# Patient Record
Sex: MALE | Race: BLACK OR AFRICAN AMERICAN | NOT HISPANIC OR LATINO | URBAN - METROPOLITAN AREA
[De-identification: names, ages, dates, MRNs, and addresses within clinical notes are randomized per-mention and may not be internally consistent; named-entity substitution may affect disease eponyms.]

---

## 2017-11-21 ENCOUNTER — EMERGENCY (EMERGENCY)
Facility: HOSPITAL | Age: 32
LOS: 1 days | Discharge: HOME | End: 2017-11-21
Admitting: EMERGENCY MEDICINE
Payer: COMMERCIAL

## 2017-11-21 VITALS
DIASTOLIC BLOOD PRESSURE: 88 MMHG | SYSTOLIC BLOOD PRESSURE: 167 MMHG | HEART RATE: 75 BPM | OXYGEN SATURATION: 99 % | RESPIRATION RATE: 16 BRPM | TEMPERATURE: 98 F

## 2017-11-21 DIAGNOSIS — Z98.890 OTHER SPECIFIED POSTPROCEDURAL STATES: Chronic | ICD-10-CM

## 2017-11-21 PROCEDURE — 99284 EMERGENCY DEPT VISIT MOD MDM: CPT

## 2017-11-21 RX ORDER — FAMOTIDINE 10 MG/ML
20 INJECTION INTRAVENOUS ONCE
Qty: 0 | Refills: 0 | Status: COMPLETED | OUTPATIENT
Start: 2017-11-21 | End: 2017-11-21

## 2017-11-21 RX ADMIN — FAMOTIDINE 20 MILLIGRAM(S): 10 INJECTION INTRAVENOUS at 14:50

## 2017-11-21 RX ADMIN — Medication 30 MILLILITER(S): at 14:50

## 2017-11-21 NOTE — ED PROVIDER NOTE - OBJECTIVE STATEMENT
31 y/o M no PMHx presents c/o LUQ abdominal pain x 4 days. Describes the pain as a "full sensation" and "indigestion". Gets relief when he belches. Able to eat w/o issue. Symptoms are not related to food intake. Having normal bowel movements, last this morning. Tried drinking seltzer and ginger ale w/o relief. Denies fevers, chills, N/V, weight loss, changes in BM or any other complaints.

## 2017-11-21 NOTE — ED ADULT TRIAGE NOTE - CHIEF COMPLAINT QUOTE
pt amb to triage c/o abdm "discomfort" x 4 days believed to be gas, tolerating PO intake w/o N/V, last BM this morning "normal," pain relieved w/ palpating and burping

## 2017-11-21 NOTE — ED PROVIDER NOTE - PLAN OF CARE
Rest and drink plenty of fluids. Avoid eating foods that can irritate your stomach such as citrus, caffeine, and dairy products. Take Maalox as needed for ingestion. Follow up with your primary care doctor within 2-3 days of hospital discharge. Follow up with a gastroenterologist for further management. If symptoms worsen, please come back to the emergency room.

## 2017-11-21 NOTE — ED PROVIDER NOTE - MEDICAL DECISION MAKING DETAILS
33 y/o M w/ epigastric "fullness" and indigestion. Has not tried simethicone or other meds for indigestion. Admits to a poor diet. Normal BM, no N/V, no fever. Abdomen nontender on exam. Will give Pepcid/Maalox and dc w/ GI f/u

## 2017-12-31 ENCOUNTER — EMERGENCY (EMERGENCY)
Facility: HOSPITAL | Age: 32
LOS: 1 days | Discharge: HOME | End: 2017-12-31
Attending: EMERGENCY MEDICINE | Admitting: EMERGENCY MEDICINE
Payer: COMMERCIAL

## 2017-12-31 VITALS
DIASTOLIC BLOOD PRESSURE: 110 MMHG | SYSTOLIC BLOOD PRESSURE: 150 MMHG | TEMPERATURE: 98 F | RESPIRATION RATE: 16 BRPM | OXYGEN SATURATION: 100 % | HEART RATE: 64 BPM

## 2017-12-31 DIAGNOSIS — Z98.890 OTHER SPECIFIED POSTPROCEDURAL STATES: Chronic | ICD-10-CM

## 2017-12-31 PROCEDURE — 99283 EMERGENCY DEPT VISIT LOW MDM: CPT

## 2017-12-31 RX ORDER — DIAZEPAM 5 MG
1 TABLET ORAL
Qty: 15 | Refills: 0
Start: 2017-12-31

## 2017-12-31 RX ORDER — KETOROLAC TROMETHAMINE 30 MG/ML
30 SYRINGE (ML) INJECTION ONCE
Qty: 0 | Refills: 0 | Status: DISCONTINUED | OUTPATIENT
Start: 2017-12-31 | End: 2017-12-31

## 2017-12-31 RX ADMIN — Medication 30 MILLIGRAM(S): at 10:37

## 2017-12-31 NOTE — ED ADULT TRIAGE NOTE - CHIEF COMPLAINT QUOTE
pain l shoulder since fri,increasing upon turning neck. denies injury. surgery l shoulder 2/2016.   seen [place] er. " muscle pain"  reports pain less,still present.   denies ch pains present. states bp has been elevated on no meds

## 2017-12-31 NOTE — ED PROVIDER NOTE - OBJECTIVE STATEMENT
31 y/o M w/ no significant PMHx presents to ED c/o x1day of lt shoulder pain radiating to the lt upper back. Notes x2days ago pt felt a sudden sharp pain the lt chest and lt arm. Was seen at [facility] ER x2days ago and had normal EKG, no blood work done, given muscles relevants in ER, d/c'd home on no meds. States tightness in lt shoulder has resolved, however pain persists. Denies nausea, vomiting, diaphoresis, and other complaints. Reports in February 2017 +h/o torn lt rotator cuff, had surgery to repair. Denies other complaints. FHx of Cardiac issues in his grandmother. Pt works in housekeeping at [employer]. Allergic to penicillin. 33 y/o M w/ no significant PMHx presents to ED c/o x1day of lt shoulder pain radiating to the lt upper back. Notes x2days ago pt felt a sudden sharp pain the lt chest and lt arm. Was seen at [facility] ER x2days ago and had normal EKG, no blood work done, given ?muscle relevants in ER, d/c'd home on no meds. States tightness in lt shoulder has resolved, however pain persists. Denies nausea, vomiting, diaphoresis, and other complaints. Reports in February 2017 +h/o torn lt rotator cuff, had surgery to repair. Denies other complaints. FHx of Cardiac issues in his grandmother. 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 Pain worse with rom.  No fam h/o early CAD

## 2019-02-01 ENCOUNTER — EMERGENCY (EMERGENCY)
Facility: HOSPITAL | Age: 34
LOS: 1 days | Discharge: HOME | End: 2019-02-01
Admitting: EMERGENCY MEDICINE
Payer: COMMERCIAL

## 2019-02-01 VITALS
DIASTOLIC BLOOD PRESSURE: 94 MMHG | TEMPERATURE: 98 F | RESPIRATION RATE: 16 BRPM | SYSTOLIC BLOOD PRESSURE: 160 MMHG | OXYGEN SATURATION: 100 % | HEART RATE: 78 BPM

## 2019-02-01 VITALS
RESPIRATION RATE: 16 BRPM | TEMPERATURE: 98 F | SYSTOLIC BLOOD PRESSURE: 144 MMHG | DIASTOLIC BLOOD PRESSURE: 100 MMHG | OXYGEN SATURATION: 100 % | HEART RATE: 80 BPM

## 2019-02-01 DIAGNOSIS — Z98.890 OTHER SPECIFIED POSTPROCEDURAL STATES: Chronic | ICD-10-CM

## 2019-02-01 PROCEDURE — 99283 EMERGENCY DEPT VISIT LOW MDM: CPT

## 2019-02-01 PROCEDURE — 73610 X-RAY EXAM OF ANKLE: CPT | Mod: 26,RT

## 2019-02-01 NOTE — ED PROVIDER NOTE - MEDICAL DECISION MAKING DETAILS
Pt is a 34 y/o M nonsmoker PMHx HTN p/w right ankle pain x 2 weeks. -- likely musculoskeletal joint pain -- xray, outpatient podiatry follow up

## 2019-02-01 NOTE — ED PROVIDER NOTE - PLAN OF CARE
Advance activity as tolerated.  Continue all previously prescribed medications as directed unless otherwise instructed.  Keep extremity elevated and wrapped.  Apply cool compresses to affected area for 15 minutes, 3-4 times per day. Take Motrin (also sold as Advil or Ibuprofen) 400-600 mg (two or three 200 mg over the counter pills) every 8 hours as needed for moderate pain or fevers-- take with food. Take Tylenol 650mg (Two 325 mg pills) every 4-6 hours as needed for pain or fevers.  Follow up with your primary care physician and podiatry (referral list provided)  in 48-72 hours- bring copies of your results.  Return to the ER for worsening or persistent symptoms, including but not limited to worsening/persistent pain, swelling, redness, fevers, and/or ANY NEW OR CONCERNING SYMPTOMS. If you have issues obtaining follow up, please call: [phone] to obtain a doctor or specialist who takes your insurance in your area.  You may call [phone] to make an appointment with the internal medicine clinic.

## 2019-02-01 NOTE — ED PROVIDER NOTE - PROGRESS NOTE DETAILS
PA STOREY:  Xray negative for acute pathology.  ACE applied with good relief.  Pt medically stable for discharge. Pt to follow up with PMD and podiatry (referral list provided).

## 2019-02-01 NOTE — ED PROVIDER NOTE - LOWER EXTREMITY EXAM, RIGHT
SWELLING/nontender; no warmth; no crepitus; FROM; 5/5 strength; 2+ dp pulse; sensation intact to light touch, < 2 sec capillary refill

## 2019-02-01 NOTE — ED PROVIDER NOTE - CHPI ED SYMPTOMS NEG
no tingling/no numbness/no stiffness/no weakness/no back pain/no fever/no deformity/no difficulty bearing weight/no bruising/no abrasion

## 2019-02-01 NOTE — ED ADULT TRIAGE NOTE - CHIEF COMPLAINT QUOTE
pt here for right ankle pain that started 2 weeks ago. denies any falls or trauma.  also c/o right swelling

## 2019-02-01 NOTE — ED PROVIDER NOTE - CARE PLAN
Principal Discharge DX:	Ankle pain  Assessment and plan of treatment:	Advance activity as tolerated.  Continue all previously prescribed medications as directed unless otherwise instructed.  Keep extremity elevated and wrapped.  Apply cool compresses to affected area for 15 minutes, 3-4 times per day. Take Motrin (also sold as Advil or Ibuprofen) 400-600 mg (two or three 200 mg over the counter pills) every 8 hours as needed for moderate pain or fevers-- take with food. Take Tylenol 650mg (Two 325 mg pills) every 4-6 hours as needed for pain or fevers.  Follow up with your primary care physician and podiatry (referral list provided)  in 48-72 hours- bring copies of your results.  Return to the ER for worsening or persistent symptoms, including but not limited to worsening/persistent pain, swelling, redness, fevers, and/or ANY NEW OR CONCERNING SYMPTOMS. If you have issues obtaining follow up, please call: [phone] to obtain a doctor or specialist who takes your insurance in your area.  You may call [phone] to make an appointment with the internal medicine clinic.

## 2019-02-01 NOTE — ED PROVIDER NOTE - OBJECTIVE STATEMENT
Pt is a 34 y/o M nonsmoker PMHx HTN p/w right ankle pain x 2 weeks.  Pt notes two weeks ago, upon standing up after waking up in the morning, pt noticed moderate pain to right ankle, greatest at lateral malleolar region, which would worsen with walking.  Pt notes pain improves with epsom salt and elevation, but worsens with walking or standing.  Pt notes mild swelling to region.  Pt denies any fevers, chills, numbness, weakness, trauma, redness, warmth, joint pain elsewhere, penile discharge, testicular pain, eye pain, h/o STDs, family history of autoimmune disorders, h/o tick bites, h/o hiking, illicit drug use, similar pian in the past, pain elsewhere, or any other specific complaints.  Presently, pt has no pain, but has pain up to 6/10, with walking.

## 2019-07-10 ENCOUNTER — EMERGENCY (EMERGENCY)
Facility: HOSPITAL | Age: 34
LOS: 1 days | Discharge: HOME | End: 2019-07-10
Admitting: EMERGENCY MEDICINE
Payer: COMMERCIAL

## 2019-07-10 VITALS
TEMPERATURE: 98 F | RESPIRATION RATE: 18 BRPM | HEART RATE: 57 BPM | DIASTOLIC BLOOD PRESSURE: 90 MMHG | SYSTOLIC BLOOD PRESSURE: 129 MMHG | OXYGEN SATURATION: 100 %

## 2019-07-10 DIAGNOSIS — Z98.890 OTHER SPECIFIED POSTPROCEDURAL STATES: Chronic | ICD-10-CM

## 2019-07-10 PROCEDURE — 99282 EMERGENCY DEPT VISIT SF MDM: CPT

## 2019-07-10 NOTE — ED PROVIDER NOTE - CLINICAL SUMMARY MEDICAL DECISION MAKING FREE TEXT BOX
Pt is a 33 y/o M nonsmoker no PMHx p/w burn 4 days ago. -- healing burn w/o e/o infection -- bacitracin, pmd follow up

## 2019-07-10 NOTE — ED PROVIDER NOTE - OBJECTIVE STATEMENT
Pt is a 33 y/o M nonsmoker no PMHx p/w burn 4 days ago.  Pt states 4 days ago while cooking grits, hot grits splattered onto left forearm.  Pt then gradually developed a "bubble" that formed at left forearm, which ruptured on its own.  Pt noted skin around burn began to swell, after which swelling and pain gradually resolved.  Pt presents today to get burned evaluated.  Pt denies any fevers, redness, pus, red streaks, or any active pain.  Pt notes last tetanus was within past 5 years.

## 2019-07-10 NOTE — ED PROVIDER NOTE - SKIN LOCATION #1
1.5 cm x 1.5 cm area of hypertrophied, nontender, nonfluctuant skin at left forearm w/o redness, warmth or purulent drainage; no crepitus; NVI distal to wound on affected extremity

## 2019-07-10 NOTE — ED PROVIDER NOTE - NSFOLLOWUPINSTRUCTIONS_ED_ALL_ED_FT
Advance activity as tolerated.  Continue all previously prescribed medications as directed unless otherwise instructed.  Apply bacitracin (available over the counter) twice daily to affected area until healed.  Follow up with your primary care physician in 48-72 hours- bring copies of your results.  Return to the ER for worsening or persistent symptoms, including but not limited to worsening/persistent pain, swelling, redness, fevers, red streaks, pus and/or ANY NEW OR CONCERNING SYMPTOMS. If you have issues obtaining follow up, please call: [phone] to obtain a doctor or specialist who takes your insurance in your area.  You may call [phone] to make an appointment with the internal medicine clinic.

## 2019-07-10 NOTE — ED PROVIDER NOTE - CARE PLAN
Principal Discharge DX:	Burn  Assessment and plan of treatment:	Advance activity as tolerated.  Continue all previously prescribed medications as directed unless otherwise instructed.  Apply bacitracin (available over the counter) twice daily to affected area until healed.  Follow up with your primary care physician in 48-72 hours- bring copies of your results.  Return to the ER for worsening or persistent symptoms, including but not limited to worsening/persistent pain, swelling, redness, fevers, red streaks, pus and/or ANY NEW OR CONCERNING SYMPTOMS. If you have issues obtaining follow up, please call: [phone] to obtain a doctor or specialist who takes your insurance in your area.  You may call [phone] to make an appointment with the internal medicine clinic.

## 2019-07-10 NOTE — ED PROVIDER NOTE - PLAN OF CARE
Advance activity as tolerated.  Continue all previously prescribed medications as directed unless otherwise instructed.  Apply bacitracin (available over the counter) twice daily to affected area until healed.  Follow up with your primary care physician in 48-72 hours- bring copies of your results.  Return to the ER for worsening or persistent symptoms, including but not limited to worsening/persistent pain, swelling, redness, fevers, red streaks, pus and/or ANY NEW OR CONCERNING SYMPTOMS. If you have issues obtaining follow up, please call: [phone] to obtain a doctor or specialist who takes your insurance in your area.  You may call [phone] to make an appointment with the internal medicine clinic.

## 2020-04-25 ENCOUNTER — MESSAGE (OUTPATIENT)
Age: 35
End: 2020-04-25

## 2021-03-12 NOTE — ED PROVIDER NOTE - CARE PLAN
Principal Discharge DX:	Indigestion  Instructions for follow-up, activity and diet:	Rest and drink plenty of fluids. Avoid eating foods that can irritate your stomach such as citrus, caffeine, and dairy products. Take Maalox as needed for ingestion. Follow up with your primary care doctor within 2-3 days of hospital discharge. Follow up with a gastroenterologist for further management. If symptoms worsen, please come back to the emergency room.
12-Mar-2021 14:33

## 2021-08-26 ENCOUNTER — INPATIENT (INPATIENT)
Facility: HOSPITAL | Age: 36
LOS: 1 days | Discharge: HOME | End: 2021-08-28
Attending: STUDENT IN AN ORGANIZED HEALTH CARE EDUCATION/TRAINING PROGRAM | Admitting: STUDENT IN AN ORGANIZED HEALTH CARE EDUCATION/TRAINING PROGRAM
Payer: COMMERCIAL

## 2021-08-26 VITALS
RESPIRATION RATE: 17 BRPM | DIASTOLIC BLOOD PRESSURE: 109 MMHG | TEMPERATURE: 98 F | SYSTOLIC BLOOD PRESSURE: 151 MMHG | OXYGEN SATURATION: 99 % | HEART RATE: 109 BPM

## 2021-08-26 DIAGNOSIS — Z98.890 OTHER SPECIFIED POSTPROCEDURAL STATES: Chronic | ICD-10-CM

## 2021-08-26 PROCEDURE — 99285 EMERGENCY DEPT VISIT HI MDM: CPT

## 2021-08-26 NOTE — ED ADULT TRIAGE NOTE - CHIEF COMPLAINT QUOTE
pt arrives w/ c/o back pain radiating to legs. 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 pt states after that he got dizzy and vomited. pt states he now has charley horses in his legs and has abdominal pain. pt satmikaela was told his BP is high but not on meds for it.

## 2021-08-27 DIAGNOSIS — Z29.9 ENCOUNTER FOR PROPHYLACTIC MEASURES, UNSPECIFIED: ICD-10-CM

## 2021-08-27 DIAGNOSIS — K35.80 UNSPECIFIED ACUTE APPENDICITIS: ICD-10-CM

## 2021-08-27 DIAGNOSIS — T67.5XXA HEAT EXHAUSTION, UNSPECIFIED, INITIAL ENCOUNTER: ICD-10-CM

## 2021-08-27 DIAGNOSIS — N17.9 ACUTE KIDNEY FAILURE, UNSPECIFIED: ICD-10-CM

## 2021-08-27 DIAGNOSIS — M62.82 RHABDOMYOLYSIS: ICD-10-CM

## 2021-08-27 LAB
ALBUMIN SERPL ELPH-MCNC: 4.4 G/DL — SIGNIFICANT CHANGE UP (ref 3.3–5)
ALBUMIN SERPL ELPH-MCNC: 5.5 G/DL — HIGH (ref 3.3–5)
ALP SERPL-CCNC: 119 U/L — SIGNIFICANT CHANGE UP (ref 40–120)
ALP SERPL-CCNC: 88 U/L — SIGNIFICANT CHANGE UP (ref 40–120)
ALT FLD-CCNC: 25 U/L — SIGNIFICANT CHANGE UP (ref 4–41)
ALT FLD-CCNC: 33 U/L — SIGNIFICANT CHANGE UP (ref 4–41)
ANION GAP SERPL CALC-SCNC: 14 MMOL/L — SIGNIFICANT CHANGE UP (ref 7–14)
ANION GAP SERPL CALC-SCNC: 19 MMOL/L — HIGH (ref 7–14)
ANION GAP SERPL CALC-SCNC: 25 MMOL/L — HIGH (ref 7–14)
APAP SERPL-MCNC: <15 UG/ML — SIGNIFICANT CHANGE UP (ref 15–25)
APPEARANCE UR: ABNORMAL
APPEARANCE UR: CLEAR — SIGNIFICANT CHANGE UP
AST SERPL-CCNC: 32 U/L — SIGNIFICANT CHANGE UP (ref 4–40)
AST SERPL-CCNC: 36 U/L — SIGNIFICANT CHANGE UP (ref 4–40)
BACTERIA # UR AUTO: ABNORMAL
BASOPHILS # BLD AUTO: 0.02 K/UL — SIGNIFICANT CHANGE UP (ref 0–0.2)
BASOPHILS NFR BLD AUTO: 0.2 % — SIGNIFICANT CHANGE UP (ref 0–2)
BILIRUB SERPL-MCNC: 0.6 MG/DL — SIGNIFICANT CHANGE UP (ref 0.2–1.2)
BILIRUB SERPL-MCNC: 0.6 MG/DL — SIGNIFICANT CHANGE UP (ref 0.2–1.2)
BILIRUB UR-MCNC: ABNORMAL
BILIRUB UR-MCNC: NEGATIVE — SIGNIFICANT CHANGE UP
BLOOD GAS VENOUS COMPREHENSIVE RESULT: SIGNIFICANT CHANGE UP
BUN SERPL-MCNC: 22 MG/DL — SIGNIFICANT CHANGE UP (ref 7–23)
BUN SERPL-MCNC: 31 MG/DL — HIGH (ref 7–23)
BUN SERPL-MCNC: 32 MG/DL — HIGH (ref 7–23)
CALCIUM SERPL-MCNC: 11 MG/DL — HIGH (ref 8.4–10.5)
CALCIUM SERPL-MCNC: 8.9 MG/DL — SIGNIFICANT CHANGE UP (ref 8.4–10.5)
CALCIUM SERPL-MCNC: 9.2 MG/DL — SIGNIFICANT CHANGE UP (ref 8.4–10.5)
CHLORIDE SERPL-SCNC: 104 MMOL/L — SIGNIFICANT CHANGE UP (ref 98–107)
CHLORIDE SERPL-SCNC: 93 MMOL/L — LOW (ref 98–107)
CHLORIDE SERPL-SCNC: 99 MMOL/L — SIGNIFICANT CHANGE UP (ref 98–107)
CHLORIDE UR-SCNC: 29 MMOL/L — SIGNIFICANT CHANGE UP
CK SERPL-CCNC: 789 U/L — HIGH (ref 30–200)
CK SERPL-CCNC: 903 U/L — HIGH (ref 30–200)
CO2 SERPL-SCNC: 15 MMOL/L — LOW (ref 22–31)
CO2 SERPL-SCNC: 16 MMOL/L — LOW (ref 22–31)
CO2 SERPL-SCNC: 20 MMOL/L — LOW (ref 22–31)
COD CRY URNS QL: ABNORMAL
COLOR SPEC: SIGNIFICANT CHANGE UP
COLOR SPEC: YELLOW — SIGNIFICANT CHANGE UP
CREAT SERPL-MCNC: 1.54 MG/DL — HIGH (ref 0.5–1.3)
CREAT SERPL-MCNC: 2.64 MG/DL — HIGH (ref 0.5–1.3)
CREAT SERPL-MCNC: 3.34 MG/DL — HIGH (ref 0.5–1.3)
DIFF PNL FLD: ABNORMAL
DIFF PNL FLD: NEGATIVE — SIGNIFICANT CHANGE UP
EOSINOPHIL # BLD AUTO: 0.02 K/UL — SIGNIFICANT CHANGE UP (ref 0–0.5)
EOSINOPHIL NFR BLD AUTO: 0.2 % — SIGNIFICANT CHANGE UP (ref 0–6)
EPI CELLS # UR: 18 /HPF — HIGH (ref 0–5)
ETHANOL SERPL-MCNC: <10 MG/DL — SIGNIFICANT CHANGE UP
GLUCOSE SERPL-MCNC: 89 MG/DL — SIGNIFICANT CHANGE UP (ref 70–99)
GLUCOSE SERPL-MCNC: 98 MG/DL — SIGNIFICANT CHANGE UP (ref 70–99)
GLUCOSE SERPL-MCNC: 99 MG/DL — SIGNIFICANT CHANGE UP (ref 70–99)
GLUCOSE UR QL: NEGATIVE — SIGNIFICANT CHANGE UP
GLUCOSE UR QL: NEGATIVE — SIGNIFICANT CHANGE UP
HCT VFR BLD CALC: 52.5 % — HIGH (ref 39–50)
HGB BLD-MCNC: 17.8 G/DL — HIGH (ref 13–17)
HYALINE CASTS # UR AUTO: 1 /LPF — SIGNIFICANT CHANGE UP (ref 0–7)
IANC: 8.59 K/UL — HIGH (ref 1.5–8.5)
IMM GRANULOCYTES NFR BLD AUTO: 0.3 % — SIGNIFICANT CHANGE UP (ref 0–1.5)
KETONES UR-MCNC: ABNORMAL
KETONES UR-MCNC: ABNORMAL
LACTATE SERPL-SCNC: 1.2 MMOL/L — SIGNIFICANT CHANGE UP (ref 0.5–2)
LEUKOCYTE ESTERASE UR-ACNC: NEGATIVE — SIGNIFICANT CHANGE UP
LEUKOCYTE ESTERASE UR-ACNC: NEGATIVE — SIGNIFICANT CHANGE UP
LYMPHOCYTES # BLD AUTO: 2.5 K/UL — SIGNIFICANT CHANGE UP (ref 1–3.3)
LYMPHOCYTES # BLD AUTO: 20.5 % — SIGNIFICANT CHANGE UP (ref 13–44)
MAGNESIUM SERPL-MCNC: 1.9 MG/DL — SIGNIFICANT CHANGE UP (ref 1.6–2.6)
MAGNESIUM SERPL-MCNC: 2 MG/DL — SIGNIFICANT CHANGE UP (ref 1.6–2.6)
MAGNESIUM SERPL-MCNC: 2.3 MG/DL — SIGNIFICANT CHANGE UP (ref 1.6–2.6)
MCHC RBC-ENTMCNC: 27.1 PG — SIGNIFICANT CHANGE UP (ref 27–34)
MCHC RBC-ENTMCNC: 33.9 GM/DL — SIGNIFICANT CHANGE UP (ref 32–36)
MCV RBC AUTO: 79.8 FL — LOW (ref 80–100)
MONOCYTES # BLD AUTO: 1.05 K/UL — HIGH (ref 0–0.9)
MONOCYTES NFR BLD AUTO: 8.6 % — SIGNIFICANT CHANGE UP (ref 2–14)
NEUTROPHILS # BLD AUTO: 8.59 K/UL — HIGH (ref 1.8–7.4)
NEUTROPHILS NFR BLD AUTO: 70.2 % — SIGNIFICANT CHANGE UP (ref 43–77)
NITRITE UR-MCNC: NEGATIVE — SIGNIFICANT CHANGE UP
NITRITE UR-MCNC: NEGATIVE — SIGNIFICANT CHANGE UP
NRBC # BLD: 0 /100 WBCS — SIGNIFICANT CHANGE UP
NRBC # FLD: 0 K/UL — SIGNIFICANT CHANGE UP
PH UR: 5.5 — SIGNIFICANT CHANGE UP (ref 5–8)
PH UR: 6 — SIGNIFICANT CHANGE UP (ref 5–8)
PHOSPHATE SERPL-MCNC: 2.5 MG/DL — SIGNIFICANT CHANGE UP (ref 2.5–4.5)
PHOSPHATE SERPL-MCNC: 5.7 MG/DL — HIGH (ref 2.5–4.5)
PHOSPHATE SERPL-MCNC: 6.2 MG/DL — HIGH (ref 2.5–4.5)
PLATELET # BLD AUTO: 162 K/UL — SIGNIFICANT CHANGE UP (ref 150–400)
POTASSIUM SERPL-MCNC: 3.9 MMOL/L — SIGNIFICANT CHANGE UP (ref 3.5–5.3)
POTASSIUM SERPL-MCNC: 4 MMOL/L — SIGNIFICANT CHANGE UP (ref 3.5–5.3)
POTASSIUM SERPL-MCNC: 4.1 MMOL/L — SIGNIFICANT CHANGE UP (ref 3.5–5.3)
POTASSIUM SERPL-SCNC: 3.9 MMOL/L — SIGNIFICANT CHANGE UP (ref 3.5–5.3)
POTASSIUM SERPL-SCNC: 4 MMOL/L — SIGNIFICANT CHANGE UP (ref 3.5–5.3)
POTASSIUM SERPL-SCNC: 4.1 MMOL/L — SIGNIFICANT CHANGE UP (ref 3.5–5.3)
POTASSIUM UR-SCNC: 9.9 MMOL/L — SIGNIFICANT CHANGE UP
PROT SERPL-MCNC: 6.9 G/DL — SIGNIFICANT CHANGE UP (ref 6–8.3)
PROT SERPL-MCNC: 9.4 G/DL — HIGH (ref 6–8.3)
PROT UR-MCNC: ABNORMAL
PROT UR-MCNC: NEGATIVE — SIGNIFICANT CHANGE UP
RBC # BLD: 6.58 M/UL — HIGH (ref 4.2–5.8)
RBC # FLD: 16.9 % — HIGH (ref 10.3–14.5)
RBC CASTS # UR COMP ASSIST: 13 /HPF — HIGH (ref 0–4)
SALICYLATES SERPL-MCNC: <5 MG/DL — LOW (ref 15–30)
SARS-COV-2 RNA SPEC QL NAA+PROBE: SIGNIFICANT CHANGE UP
SODIUM SERPL-SCNC: 133 MMOL/L — LOW (ref 135–145)
SODIUM SERPL-SCNC: 134 MMOL/L — LOW (ref 135–145)
SODIUM SERPL-SCNC: 138 MMOL/L — SIGNIFICANT CHANGE UP (ref 135–145)
SODIUM UR-SCNC: 33 MMOL/L — SIGNIFICANT CHANGE UP
SP GR SPEC: 1.01 — SIGNIFICANT CHANGE UP (ref 1–1.05)
SP GR SPEC: 1.03 — SIGNIFICANT CHANGE UP (ref 1–1.05)
TOXICOLOGY SCREEN, DRUGS OF ABUSE, SERUM RESULT: SIGNIFICANT CHANGE UP
UROBILINOGEN FLD QL: ABNORMAL
UROBILINOGEN FLD QL: SIGNIFICANT CHANGE UP
WBC # BLD: 12.22 K/UL — HIGH (ref 3.8–10.5)
WBC # FLD AUTO: 12.22 K/UL — HIGH (ref 3.8–10.5)
WBC UR QL: 28 /HPF — HIGH (ref 0–5)

## 2021-08-27 PROCEDURE — 76770 US EXAM ABDO BACK WALL COMP: CPT | Mod: 26

## 2021-08-27 PROCEDURE — 99222 1ST HOSP IP/OBS MODERATE 55: CPT

## 2021-08-27 RX ORDER — SODIUM CHLORIDE 9 MG/ML
1000 INJECTION, SOLUTION INTRAVENOUS ONCE
Refills: 0 | Status: COMPLETED | OUTPATIENT
Start: 2021-08-27 | End: 2021-08-27

## 2021-08-27 RX ORDER — ACETAMINOPHEN 500 MG
650 TABLET ORAL ONCE
Refills: 0 | Status: COMPLETED | OUTPATIENT
Start: 2021-08-27 | End: 2021-08-27

## 2021-08-27 RX ORDER — LANOLIN ALCOHOL/MO/W.PET/CERES
3 CREAM (GRAM) TOPICAL AT BEDTIME
Refills: 0 | Status: DISCONTINUED | OUTPATIENT
Start: 2021-08-27 | End: 2021-08-28

## 2021-08-27 RX ORDER — ONDANSETRON 8 MG/1
4 TABLET, FILM COATED ORAL EVERY 8 HOURS
Refills: 0 | Status: DISCONTINUED | OUTPATIENT
Start: 2021-08-27 | End: 2021-08-28

## 2021-08-27 RX ORDER — ACETAMINOPHEN 500 MG
650 TABLET ORAL EVERY 6 HOURS
Refills: 0 | Status: DISCONTINUED | OUTPATIENT
Start: 2021-08-27 | End: 2021-08-28

## 2021-08-27 RX ORDER — SODIUM CHLORIDE 9 MG/ML
1000 INJECTION, SOLUTION INTRAVENOUS
Refills: 0 | Status: DISCONTINUED | OUTPATIENT
Start: 2021-08-27 | End: 2021-08-28

## 2021-08-27 RX ORDER — SODIUM CHLORIDE 9 MG/ML
1000 INJECTION INTRAMUSCULAR; INTRAVENOUS; SUBCUTANEOUS ONCE
Refills: 0 | Status: COMPLETED | OUTPATIENT
Start: 2021-08-27 | End: 2021-08-27

## 2021-08-27 RX ADMIN — Medication 650 MILLIGRAM(S): at 01:19

## 2021-08-27 RX ADMIN — SODIUM CHLORIDE 1000 MILLILITER(S): 9 INJECTION, SOLUTION INTRAVENOUS at 01:28

## 2021-08-27 RX ADMIN — SODIUM CHLORIDE 1000 MILLILITER(S): 9 INJECTION INTRAMUSCULAR; INTRAVENOUS; SUBCUTANEOUS at 01:19

## 2021-08-27 RX ADMIN — SODIUM CHLORIDE 1000 MILLILITER(S): 9 INJECTION, SOLUTION INTRAVENOUS at 03:43

## 2021-08-27 RX ADMIN — SODIUM CHLORIDE 150 MILLILITER(S): 9 INJECTION, SOLUTION INTRAVENOUS at 06:52

## 2021-08-27 RX ADMIN — SODIUM CHLORIDE 150 MILLILITER(S): 9 INJECTION, SOLUTION INTRAVENOUS at 22:04

## 2021-08-27 NOTE — ED ADULT NURSE NOTE - OBJECTIVE STATEMENT
Pt received to trauma a a&ox4, ambulatory c/o body pain. Pt states that he was moving dressers earlier today and he had cramping to his right arm. Pt states that he started shaking his arm out and then became very dizzy. Pt states that he had 1 episode of vomiting. Pt states that after he vomited he felt better. Pt states that he went home and when he went into the bath, his whole body began to cramp. Pt states that he had cramping to bilateral arms and legs. Pt denies any cramping at this time. Pt in no acute distress. Respirations even and unlabored. Pt placed on cardiac monitor. Comfort measures provided. Awaiting MD benjamin.

## 2021-08-27 NOTE — ED PROVIDER NOTE - NS ED ROS FT
Gen: +generalized body cramps Denies fever, chills  CV: Denies chest pain  Skin: Denies rash, erythema  Resp: Denies SOB, cough  ENT: Denies nasal congestion  Eyes: Denies blurry vision  GI: +nausea, vomiting, denies diarrhea  Msk: Denies LE swelling  : Denies dysuria  Neuro: Denies headache

## 2021-08-27 NOTE — H&P ADULT - NSHPSOCIALHISTORY_GEN_ALL_CORE
Fully independent in all ADLs. Fully independent in all ADLs.  Reports no smoking, occasional Etoh use, and no illicit drug use.

## 2021-08-27 NOTE — PROVIDER CONTACT NOTE (OTHER) - ASSESSMENT
a/ox4, on maintenance fluids, BP of 157/100, denies of pain, just generalized soreness
Patient /111, recheck 159/114. Patient states this BP is his normal. Patient denies chest pain, dizziness, vision changes, shortness of breath. Patient prescribed BP meds at home but does not take them. RN educated patient on risks of prolonged high blood pressure.

## 2021-08-27 NOTE — ED PROVIDER NOTE - PROGRESS NOTE DETAILS
DD ED ATTG:  Pt found to have renal failure, no previous labs available.  Rpt labs, rx maintenance fluids, check renal US, admit.

## 2021-08-27 NOTE — H&P ADULT - NSHPREVIEWOFSYSTEMS_GEN_ALL_CORE
Review of Systems:   CONSTITUTIONAL: No fever, weight loss, or fatigue  EYES: No eye pain, visual disturbances, or discharge  ENMT:  No difficulty hearing, tinnitus, vertigo; No sinus or throat pain  NECK: No pain or stiffness  RESPIRATORY: No cough, wheezing, chills or hemoptysis; No shortness of breath  CARDIOVASCULAR: No chest pain, palpitations, dizziness, or leg swelling  GASTROINTESTINAL: No abdominal or epigastric pain. No nausea, vomiting, or hematemesis; No diarrhea or constipation. No melena or hematochezia.  GENITOURINARY: No dysuria, frequency, hematuria, or incontinence  NEUROLOGICAL: No headaches, memory loss, loss of strength, numbness, or tremors  SKIN: No itching, burning, rashes, or lesions   ENDOCRINE: No heat or cold intolerance; No hair loss  MUSCULOSKELETAL: No joint pain or swelling; No muscle, back, or extremity pain Review of Systems:   CONSTITUTIONAL: No fever, weight loss, or fatigue  EYES: No eye pain, visual disturbances, or discharge  ENMT:  No difficulty hearing, tinnitus, vertigo; No sinus or throat pain  NECK: No pain or stiffness  RESPIRATORY: No cough, wheezing, chills or hemoptysis; No shortness of breath  CARDIOVASCULAR: No chest pain, palpitations, dizziness, or leg swelling  GASTROINTESTINAL: No abdominal or epigastric pain. NO N/V/C/D.   GENITOURINARY: No dysuria, frequency, hematuria, or incontinence  NEUROLOGICAL: No headaches, memory loss, loss of strength, numbness, or tremors  SKIN: No itching, burning, rashes, or lesions   ENDOCRINE: No heat or cold intolerance; No hair loss  MUSCULOSKELETAL: Mild muscle aches and pains, improving. Review of Systems:   CONSTITUTIONAL: No fever, weight loss, or fatigue  EYES: No eye pain, visual disturbances, or discharge  ENMT:  No difficulty hearing, tinnitus, vertigo; No sinus or throat pain  NECK: No pain or stiffness  RESPIRATORY: No cough, wheezing, chills or hemoptysis; No shortness of breath  CARDIOVASCULAR: No chest pain, palpitations, or leg swelling, dizziness +   GASTROINTESTINAL: No abdominal or epigastric pain. NO N/V/C/D.   GENITOURINARY: No dysuria, frequency, hematuria, or incontinence  NEUROLOGICAL: No headaches, memory loss, loss of strength, numbness, or tremors  SKIN: No itching, burning, rashes, or lesions   ENDOCRINE: No heat or cold intolerance; No hair loss  MUSCULOSKELETAL: Mild muscle aches and pains, improving.

## 2021-08-27 NOTE — H&P ADULT - HISTORY OF PRESENT ILLNESS
HPI: 37 yo M with PMH of HTN (does not take any medications by choice) presents with 1d of body cramping. States he was moving and lifting heavy furniture over past couple of days. Denies chest pain, shortness of breath, f/c. Had an episode of n/v earlier during the day. No nausea at this time.   HPI: 35 yo M with PMH of HTN (does not take any medications by choice) presents with 1d of body cramping, muscle cramps in his arms, stomach, and legs, associated with one episode of nonbloody/nonbilious vomiting. States he was moving and lifting heavy furniture over past couple of days in the excessive heat. Denies chest pain, shortness of breath, f/c. Had an episode of n/v earlier during the day. No nausea at this time.   HPI: 35 yo M with PMH of HTN (does not take any medications by choice) presents with 1d of body cramping, muscle cramps in his arms, stomach, and legs, associated with one episode of nonbloody/nonbilious vomiting. States he was moving and lifting heavy furniture over past couple of days in the excessive heat. Denies chest pain, shortness of breath, abdominal pain, fever, chills, dizziness, palpitations. Patient reports he noted he had not urinated the whole day yesterday. No prior similar episodes. Currently not on any medications as home.    HPI: 35 yo M with PMH of HTN (does not take any medications by choice) presents with 1d of body cramping, muscle cramps in his arms, stomach, and legs, associated with dizziness, mild headache, one episode of nonbloody/nonbilious vomiting. States he was moving and lifting heavy furniture over past couple of days in the excessive heat. Denies chest pain, shortness of breath, abdominal pain, fever, chills, dizziness, palpitations. Patient reports he noted he had not urinated the whole day yesterday. No prior similar episodes. Currently not on any medications as home.

## 2021-08-27 NOTE — PHARMACOTHERAPY INTERVENTION NOTE - COMMENTS
Medication history is complete. Medication list updated in Outpatient Medication Record (OMR). Please call spectra [id number] if you have any questions.

## 2021-08-27 NOTE — ED PROVIDER NOTE - PHYSICAL EXAMINATION
Gen: non toxic appearing, NAD   Head: NC/NT  Eyes:   anicteric  ENT: airway patent, mmm   CV: RRR, +S1/S2   Resp: CTAB, symmetric breath sounds   GI:   abdomen soft non-distended, NTTP   Back: no CVA tenderness  Extremities -  no edema  Skin: no rashes, colors changes or bruising  Neuro: A&Ox4, following commands, speech clear, moving all four extremities spontaneously, 5/5 strength, sensation intact

## 2021-08-27 NOTE — ED PROVIDER NOTE - CARE PLAN
1 Principal Discharge DX:	CESILIA (acute kidney injury)  Secondary Diagnosis:	Rhabdomyolysis  Secondary Diagnosis:	Volume depletion

## 2021-08-27 NOTE — ED PROVIDER NOTE - ATTENDING CONTRIBUTION TO CARE
36F p/w back pain rad to legs started after moving furniture then got dizzy and had vomiting.  Pt says he "caught a charley horse" in his arm.   When he vomited felt same pain in stomach, was concerned for hernia, went home had epsom salt bath, then has spasm of legs arms x several minutes.  Feeling better now except R arm still having some pain.  Pt was working all day and it was hot outside today.  Pt says he didn't drink much water.  Yesterday was OK.  Feels worse than a bad workout.  pMHX none - no meds.  PSHX  L rotator cuff repair.  No T.  No alcohol today.  All - PCN - unknown.  Pt does not have PMD will provide resources for PMD.   Pt tried tylenol at 5pm and tried lidocaine patches as well.  Nontender abd and no abd swellings unlikely hernia.    VS:  unremarkable except tachycardia, HTN    GEN - NAD; malaise;   A+O x3   HEAD - NC/AT     ENT - PEERL, EOMI, mucous membranes   dry, no discharge      NECK: Neck supple, non-tender without lymphadenopathy, no masses, no JVD  PULM - CTA b/l,  symmetric breath sounds  COR -  normal heart sounds    ABD - , ND, NT, soft,  BACK - no CVA tenderness, nontender spine     EXTREMS - no edema, no deformity, warm and well perfused    SKIN - no rash    or bruising      NEUROLOGIC - alert, face symmetric, speech fluent, sensation nl, motor no focal deficit.

## 2021-08-27 NOTE — H&P ADULT - PROBLEM SELECTOR PLAN 2
In the setting of prolonged sun exposure during strenuous work.   Clinically improved, symptoms resolving.   Encourage PO intake/hydration, continue IV Fluids for now.   Trend labs, CK, lactate, Cr, electrolytes.   Counseled on adequately hydrating during exercise/strenuous work.

## 2021-08-27 NOTE — H&P ADULT - PROBLEM SELECTOR PLAN 1
CESILIA with Creatinine 3.3 on admission, unclear Baseline Cr.   Likely hemodynamically mediated in the s/o Heat exhaustion/dehydration.   Cr now improving to 2.6, continue to trend.   Continue IV fluid hydration. Monitor I/Os, currently voiding well.   Elevated Phos. Continue to monitor electrolytes (K, Phos, Mg).   Follow up repeat UA, urine electrolytes, urine toxicology.   Avoid nephrotoxic agents, renally dose medications.   Kidney U/S: unremarkable, no hydronephrosis.

## 2021-08-27 NOTE — H&P ADULT - ASSESSMENT
HPI: 37 yo M with PMH of HTN (does not take any medications by choice) presents with 1d of body cramping, muscle cramps in his arms, stomach, and legs, associated with one episode of nonbloody/nonbilious vomiting admitted for Dehydration / CESILIA / Mild Rhabdo in setting of Heat Exhaustion.  HPI: 35 yo M with PMH of HTN (does not take any medications by choice) presents with 1d of body cramping, muscle cramps in his arms, stomach, and legs, associated with dizziness, mild headache, nonbloody/nonbilious vomiting admitted for Dehydration / CESILIA / Mild Rhabdo in setting of Heat Exhaustion.

## 2021-08-27 NOTE — H&P ADULT - PROBLEM SELECTOR PLAN 4
DVT ppx: Not indicated ,IMPROVE 0, ambulate as tolerated.   COVID negative. Received Pfizer 2nd dose on August 13th.   Dispo: Likely home in 1-2 days pending improvement in labs, symptoms.

## 2021-08-27 NOTE — H&P ADULT - NSHPLABSRESULTS_GEN_ALL_CORE
LABS: reviewed.                        17.8   12.22 )-----------( 162      ( 27 Aug 2021 01:51 )             52.5         134<L>  |  99  |  31<H>  ----------------------------<  98  3.9   |  16<L>  |  2.64<H>    Ca    9.2      27 Aug 2021 03:55  Phos  5.7       Mg     2.00         TPro  9.4<H>  /  Alb  5.5<H>  /  TBili  0.6  /  DBili  x   /  AST  32  /  ALT  33  /  AlkPhos  119      CARDIAC MARKERS ( 27 Aug 2021 01:51 )  x     / x     / 789 U/L / x     / x        Urinalysis Basic - ( 27 Aug 2021 04:06 )    Color: Yellow / Appearance: Slightly Turbid / S.028 / pH: x  Gluc: x / Ketone: Small  / Bili: Small / Urobili: 3 mg/dL   Blood: x / Protein: 100 mg/dL / Nitrite: Negative   Leuk Esterase: Negative / RBC: 13 /HPF / WBC 28 /HPF   Sq Epi: x / Non Sq Epi: 18 /HPF / Bacteria: Few

## 2021-08-27 NOTE — H&P ADULT - PROBLEM SELECTOR PLAN 3
Mild Rhabdo, associated with CESILIA, and muscle cramping. Improving.   Continue IV fluid hydration with LR, and monitor CK levels. Mild Rhabdo, assoc w/ CESILIA, and muscle cramping. Improving. LFTs wnl.   Continue IV fluid hydration with LR, and monitor CK levels.

## 2021-08-27 NOTE — ED PROVIDER NOTE - OBJECTIVE STATEMENT
35 yo M with no pmhx presents with 1d of body cramping. States he was moving and lifting heavy furniture over past couple of days. ReportDenies chest pain, shortness of breath, f/c. 35 yo M with  pmhx of Htn (does not take any medications by choice) presents with 1d of body cramping. States he was moving and lifting heavy furniture over past couple of days. Denies chest pain, shortness of breath, f/c. Had an episode of n/v earlier during the day. No nausea at this time.

## 2021-08-28 ENCOUNTER — TRANSCRIPTION ENCOUNTER (OUTPATIENT)
Age: 36
End: 2021-08-28

## 2021-08-28 VITALS
RESPIRATION RATE: 17 BRPM | OXYGEN SATURATION: 100 % | HEART RATE: 67 BPM | DIASTOLIC BLOOD PRESSURE: 84 MMHG | TEMPERATURE: 98 F | SYSTOLIC BLOOD PRESSURE: 146 MMHG

## 2021-08-28 LAB
ALBUMIN SERPL ELPH-MCNC: 3.8 G/DL — SIGNIFICANT CHANGE UP (ref 3.3–5)
ALP SERPL-CCNC: 77 U/L — SIGNIFICANT CHANGE UP (ref 40–120)
ALT FLD-CCNC: 22 U/L — SIGNIFICANT CHANGE UP (ref 4–41)
ANION GAP SERPL CALC-SCNC: 11 MMOL/L — SIGNIFICANT CHANGE UP (ref 7–14)
ANION GAP SERPL CALC-SCNC: 13 MMOL/L — SIGNIFICANT CHANGE UP (ref 7–14)
AST SERPL-CCNC: 30 U/L — SIGNIFICANT CHANGE UP (ref 4–40)
BASOPHILS # BLD AUTO: 0.01 K/UL — SIGNIFICANT CHANGE UP (ref 0–0.2)
BASOPHILS NFR BLD AUTO: 0.2 % — SIGNIFICANT CHANGE UP (ref 0–2)
BILIRUB SERPL-MCNC: 0.6 MG/DL — SIGNIFICANT CHANGE UP (ref 0.2–1.2)
BUN SERPL-MCNC: 12 MG/DL — SIGNIFICANT CHANGE UP (ref 7–23)
BUN SERPL-MCNC: 16 MG/DL — SIGNIFICANT CHANGE UP (ref 7–23)
CALCIUM SERPL-MCNC: 8.9 MG/DL — SIGNIFICANT CHANGE UP (ref 8.4–10.5)
CALCIUM SERPL-MCNC: 9.1 MG/DL — SIGNIFICANT CHANGE UP (ref 8.4–10.5)
CHLORIDE SERPL-SCNC: 104 MMOL/L — SIGNIFICANT CHANGE UP (ref 98–107)
CHLORIDE SERPL-SCNC: 105 MMOL/L — SIGNIFICANT CHANGE UP (ref 98–107)
CK SERPL-CCNC: 589 U/L — HIGH (ref 30–200)
CK SERPL-CCNC: 651 U/L — HIGH (ref 30–200)
CO2 SERPL-SCNC: 21 MMOL/L — LOW (ref 22–31)
CO2 SERPL-SCNC: 22 MMOL/L — SIGNIFICANT CHANGE UP (ref 22–31)
COVID-19 SPIKE DOMAIN AB INTERP: POSITIVE
COVID-19 SPIKE DOMAIN ANTIBODY RESULT: >250 U/ML — HIGH
CREAT SERPL-MCNC: 0.92 MG/DL — SIGNIFICANT CHANGE UP (ref 0.5–1.3)
CREAT SERPL-MCNC: 1 MG/DL — SIGNIFICANT CHANGE UP (ref 0.5–1.3)
EOSINOPHIL # BLD AUTO: 0.31 K/UL — SIGNIFICANT CHANGE UP (ref 0–0.5)
EOSINOPHIL NFR BLD AUTO: 5.2 % — SIGNIFICANT CHANGE UP (ref 0–6)
GLUCOSE SERPL-MCNC: 105 MG/DL — HIGH (ref 70–99)
GLUCOSE SERPL-MCNC: 89 MG/DL — SIGNIFICANT CHANGE UP (ref 70–99)
HCT VFR BLD CALC: 43.4 % — SIGNIFICANT CHANGE UP (ref 39–50)
HGB BLD-MCNC: 14.3 G/DL — SIGNIFICANT CHANGE UP (ref 13–17)
IANC: 2.66 K/UL — SIGNIFICANT CHANGE UP (ref 1.5–8.5)
IMM GRANULOCYTES NFR BLD AUTO: 0.2 % — SIGNIFICANT CHANGE UP (ref 0–1.5)
LACTATE SERPL-SCNC: 1 MMOL/L — SIGNIFICANT CHANGE UP (ref 0.5–2)
LYMPHOCYTES # BLD AUTO: 2.18 K/UL — SIGNIFICANT CHANGE UP (ref 1–3.3)
LYMPHOCYTES # BLD AUTO: 36.6 % — SIGNIFICANT CHANGE UP (ref 13–44)
MAGNESIUM SERPL-MCNC: 1.6 MG/DL — SIGNIFICANT CHANGE UP (ref 1.6–2.6)
MAGNESIUM SERPL-MCNC: 1.8 MG/DL — SIGNIFICANT CHANGE UP (ref 1.6–2.6)
MCHC RBC-ENTMCNC: 26.7 PG — LOW (ref 27–34)
MCHC RBC-ENTMCNC: 32.9 GM/DL — SIGNIFICANT CHANGE UP (ref 32–36)
MCV RBC AUTO: 81.1 FL — SIGNIFICANT CHANGE UP (ref 80–100)
MONOCYTES # BLD AUTO: 0.79 K/UL — SIGNIFICANT CHANGE UP (ref 0–0.9)
MONOCYTES NFR BLD AUTO: 13.3 % — SIGNIFICANT CHANGE UP (ref 2–14)
NEUTROPHILS # BLD AUTO: 2.66 K/UL — SIGNIFICANT CHANGE UP (ref 1.8–7.4)
NEUTROPHILS NFR BLD AUTO: 44.5 % — SIGNIFICANT CHANGE UP (ref 43–77)
NRBC # BLD: 0 /100 WBCS — SIGNIFICANT CHANGE UP
NRBC # FLD: 0 K/UL — SIGNIFICANT CHANGE UP
PHOSPHATE SERPL-MCNC: 1.9 MG/DL — LOW (ref 2.5–4.5)
PHOSPHATE SERPL-MCNC: 2.5 MG/DL — SIGNIFICANT CHANGE UP (ref 2.5–4.5)
PLATELET # BLD AUTO: 121 K/UL — LOW (ref 150–400)
POTASSIUM SERPL-MCNC: 3.8 MMOL/L — SIGNIFICANT CHANGE UP (ref 3.5–5.3)
POTASSIUM SERPL-MCNC: 3.9 MMOL/L — SIGNIFICANT CHANGE UP (ref 3.5–5.3)
POTASSIUM SERPL-SCNC: 3.8 MMOL/L — SIGNIFICANT CHANGE UP (ref 3.5–5.3)
POTASSIUM SERPL-SCNC: 3.9 MMOL/L — SIGNIFICANT CHANGE UP (ref 3.5–5.3)
PROT SERPL-MCNC: 6.3 G/DL — SIGNIFICANT CHANGE UP (ref 6–8.3)
RBC # BLD: 5.35 M/UL — SIGNIFICANT CHANGE UP (ref 4.2–5.8)
RBC # FLD: 15.6 % — HIGH (ref 10.3–14.5)
SARS-COV-2 IGG+IGM SERPL QL IA: >250 U/ML — HIGH
SARS-COV-2 IGG+IGM SERPL QL IA: POSITIVE
SODIUM SERPL-SCNC: 137 MMOL/L — SIGNIFICANT CHANGE UP (ref 135–145)
SODIUM SERPL-SCNC: 139 MMOL/L — SIGNIFICANT CHANGE UP (ref 135–145)
WBC # BLD: 5.96 K/UL — SIGNIFICANT CHANGE UP (ref 3.8–10.5)
WBC # FLD AUTO: 5.96 K/UL — SIGNIFICANT CHANGE UP (ref 3.8–10.5)

## 2021-08-28 PROCEDURE — 99239 HOSP IP/OBS DSCHRG MGMT >30: CPT

## 2021-08-28 RX ORDER — SODIUM,POTASSIUM PHOSPHATES 278-250MG
1 POWDER IN PACKET (EA) ORAL
Qty: 6 | Refills: 0
Start: 2021-08-28 | End: 2021-08-29

## 2021-08-28 RX ORDER — SODIUM,POTASSIUM PHOSPHATES 278-250MG
1 POWDER IN PACKET (EA) ORAL THREE TIMES A DAY
Refills: 0 | Status: DISCONTINUED | OUTPATIENT
Start: 2021-08-28 | End: 2021-08-28

## 2021-08-28 RX ADMIN — SODIUM CHLORIDE 150 MILLILITER(S): 9 INJECTION, SOLUTION INTRAVENOUS at 05:16

## 2021-08-28 RX ADMIN — Medication 1 TABLET(S): at 15:33

## 2021-08-28 NOTE — PROGRESS NOTE ADULT - SUBJECTIVE AND OBJECTIVE BOX
Patient is a 36y old  Male who presents with a chief complaint of Dehydration, CESILIA (27 Aug 2021 11:50)      SUBJECTIVE / OVERNIGHT EVENTS:    MEDICATIONS  (STANDING):  lactated ringers. 1000 milliLiter(s) (150 mL/Hr) IV Continuous <Continuous>    MEDICATIONS  (PRN):  acetaminophen   Tablet .. 650 milliGRAM(s) Oral every 6 hours PRN Temp greater or equal to 38.5C (101.3F), Mild Pain (1 - 3)  melatonin 3 milliGRAM(s) Oral at bedtime PRN Insomnia  ondansetron Injectable 4 milliGRAM(s) IV Push every 8 hours PRN Nausea and/or Vomiting      Vital Signs Last 24 Hrs  T(C): 36.4 (28 Aug 2021 05:15), Max: 37.1 (27 Aug 2021 13:35)  T(F): 97.6 (28 Aug 2021 05:15), Max: 98.7 (27 Aug 2021 13:35)  HR: 60 (28 Aug 2021 05:15) (60 - 90)  BP: 128/70 (28 Aug 2021 05:15) (128/70 - 177/111)  BP(mean): --  RR: 16 (28 Aug 2021 05:15) (16 - 18)  SpO2: 100% (28 Aug 2021 05:15) (98% - 100%)  CAPILLARY BLOOD GLUCOSE        I&O's Summary      PHYSICAL EXAM:  GENERAL: NAD, well-developed  HEAD:  Atraumatic, Normocephalic  EYES: EOMI, PERRLA, conjunctiva and sclera clear  NECK: Supple, No JVD  CHEST/LUNG: Clear to auscultation bilaterally; No wheeze  HEART: Regular rate and rhythm; No murmurs, rubs, or gallops  ABDOMEN: Soft, Nontender, Nondistended; Bowel sounds present  EXTREMITIES:  2+ Peripheral Pulses, No clubbing, cyanosis, or edema  PSYCH: AAOx3  NEUROLOGY: non-focal  SKIN: No rashes or lesions    LABS:                        14.3   5.96  )-----------( 121      ( 28 Aug 2021 06:36 )             43.4         139  |  105  |  16  ----------------------------<  89  3.9   |  21<L>  |  1.00    Ca    8.9      28 Aug 2021 06:36  Phos  2.5       Mg     1.80         TPro  6.3  /  Alb  3.8  /  TBili  0.6  /  DBili  x   /  AST  30  /  ALT  22  /  AlkPhos  77        CARDIAC MARKERS ( 28 Aug 2021 06:36 )  x     / x     / 651 U/L / x     / x      CARDIAC MARKERS ( 27 Aug 2021 16:20 )  x     / x     / 903 U/L / x     / x      CARDIAC MARKERS ( 27 Aug 2021 01:51 )  x     / x     / 789 U/L / x     / x          Urinalysis Basic - ( 27 Aug 2021 12:49 )    Color: Light Yellow / Appearance: Clear / S.014 / pH: x  Gluc: x / Ketone: Small  / Bili: Negative / Urobili: <2 mg/dL   Blood: x / Protein: Negative / Nitrite: Negative   Leuk Esterase: Negative / RBC: x / WBC x   Sq Epi: x / Non Sq Epi: x / Bacteria: x        RADIOLOGY & ADDITIONAL TESTS:    Imaging Personally Reviewed:    Consultant(s) Notes Reviewed:      Care Discussed with Consultants/Other Providers:   Patient is a 36y old  Male who presents with a chief complaint of Dehydration, CESILIA (27 Aug 2021 11:50)      SUBJECTIVE / OVERNIGHT EVENTS: patient seen and examined by bedside, pt feel better, body soreness improving, denies headache, dizziness, SOB, CP, Palpitations , N/V/D, abdominal pain        MEDICATIONS  (STANDING):  lactated ringers. 1000 milliLiter(s) (150 mL/Hr) IV Continuous <Continuous>    MEDICATIONS  (PRN):  acetaminophen   Tablet .. 650 milliGRAM(s) Oral every 6 hours PRN Temp greater or equal to 38.5C (101.3F), Mild Pain (1 - 3)  melatonin 3 milliGRAM(s) Oral at bedtime PRN Insomnia  ondansetron Injectable 4 milliGRAM(s) IV Push every 8 hours PRN Nausea and/or Vomiting      Vital Signs Last 24 Hrs  T(C): 36.4 (28 Aug 2021 05:15), Max: 37.1 (27 Aug 2021 13:35)  T(F): 97.6 (28 Aug 2021 05:15), Max: 98.7 (27 Aug 2021 13:35)  HR: 60 (28 Aug 2021 05:15) (60 - 90)  BP: 128/70 (28 Aug 2021 05:15) (128/70 - 177/111)  BP(mean): --  RR: 16 (28 Aug 2021 05:15) (16 - 18)  SpO2: 100% (28 Aug 2021 05:15) (98% - 100%)  CAPILLARY BLOOD GLUCOSE        I&O's Summary      PHYSICAL EXAM:  GENERAL: NAD, well-developed  HEAD:  Atraumatic, Normocephalic  EYES: EOMI, PERRLA, conjunctiva and sclera clear  NECK: Supple, No JVD  CHEST/LUNG: Clear to auscultation bilaterally; No wheeze  HEART: Regular rate and rhythm; No murmurs, rubs, or gallops  ABDOMEN: Soft, Nontender, Nondistended; Bowel sounds present  EXTREMITIES:  2+ Peripheral Pulses, No clubbing, cyanosis, or edema  PSYCH: AAOx3  NEUROLOGY: non-focal  SKIN: No rashes or lesions    LABS:                        14.3   5.96  )-----------( 121      ( 28 Aug 2021 06:36 )             43.4     08-28    139  |  105  |  16  ----------------------------<  89  3.9   |  21<L>  |  1.00    Ca    8.9      28 Aug 2021 06:36  Phos  2.5       Mg     1.80         TPro  6.3  /  Alb  3.8  /  TBili  0.6  /  DBili  x   /  AST  30  /  ALT  22  /  AlkPhos  77          CARDIAC MARKERS ( 28 Aug 2021 06:36 )  x     / x     / 651 U/L / x     / x      CARDIAC MARKERS ( 27 Aug 2021 16:20 )  x     / x     / 903 U/L / x     / x      CARDIAC MARKERS ( 27 Aug 2021 01:51 )  x     / x     / 789 U/L / x     / x          Urinalysis Basic - ( 27 Aug 2021 12:49 )    Color: Light Yellow / Appearance: Clear / S.014 / pH: x  Gluc: x / Ketone: Small  / Bili: Negative / Urobili: <2 mg/dL   Blood: x / Protein: Negative / Nitrite: Negative   Leuk Esterase: Negative / RBC: x / WBC x   Sq Epi: x / Non Sq Epi: x / Bacteria: x        RADIOLOGY & ADDITIONAL TESTS:    Imaging Personally Reviewed:    Consultant(s) Notes Reviewed:      Care Discussed with Consultants/Other Providers:

## 2021-08-28 NOTE — DISCHARGE NOTE NURSING/CASE MANAGEMENT/SOCIAL WORK - PATIENT PORTAL LINK FT
You can access the FollowMyHealth Patient Portal offered by [facility] by registering at the following website: [email or web address]. By joining [employer]’s FollowMyHealth portal, you will also be able to view your health information using other applications (apps) compatible with our system.

## 2021-08-28 NOTE — DISCHARGE NOTE NURSING/CASE MANAGEMENT/SOCIAL WORK - NSDCVIVACCINE_GEN_ALL_CORE_FT
Tdap; 18-Feb-2015 11:05; Brittni Recinos (RN); [employer]; [id number]; IntraMuscular; Deltoid Left.; 0.5 milliLiter(s); VIS (VIS Published: 09-May-2013, VIS Presented: 18-Feb-2015);

## 2021-08-28 NOTE — DISCHARGE NOTE PROVIDER - NSDCCPCAREPLAN_GEN_ALL_CORE_FT
PRINCIPAL DISCHARGE DIAGNOSIS  Diagnosis: CESILIA (acute kidney injury)  Assessment and Plan of Treatment: resolved, treated with IV fluids, avoid dehydration      SECONDARY DISCHARGE DIAGNOSES  Diagnosis: Rhabdomyolysis  Assessment and Plan of Treatment: CPK downtrending. Continue to drink at least 2-3 L at day. Make a follow up appt with Your PCp in 1 week to repeat kidney function and CPK, on dc was 583 down from 930    Diagnosis: Hypophosphatemia  Assessment and Plan of Treatment:

## 2021-08-28 NOTE — PROGRESS NOTE ADULT - ASSESSMENT
HPI: 37 yo M with PMH of HTN (does not take any medications by choice) presents with 1d of body cramping, muscle cramps in his arms, stomach, and legs, associated with dizziness, mild headache, nonbloody/nonbilious vomiting admitted for Dehydration / CESILIA / Mild Rhabdo in setting of Heat Exhaustion.

## 2021-08-28 NOTE — DISCHARGE NOTE PROVIDER - HOSPITAL COURSE
37 yo M with PMH of HTN (does not take any medications by choice) presents with 1d of body cramping, muscle cramps in his arms, stomach, and legs, associated with dizziness, mild headache, nonbloody/nonbilious vomiting admitted for Dehydration / CESILIA / Mild Rhabdo in setting of Heat Exhaustion. Treated with IVF with significantly improved CPK and Creat. Clinically feels significantly better and is optimized for dc

## 2021-08-28 NOTE — PROGRESS NOTE ADULT - PROBLEM SELECTOR PLAN 3
Mild Rhabdo, assoc w/ CESILIA, and muscle cramping. Improving. LFTs wnl.   Continue IV fluid hydration with LR, and monitor CK levels. Mild Rhabdo, assoc w/ CESILIA, and muscle cramping. Improving. LFTs wnl.   Continue IV fluid hydration with LR, and monitor CK levels.  repeat

## 2021-08-28 NOTE — DISCHARGE NOTE NURSING/CASE MANAGEMENT/SOCIAL WORK - NSDCPEFALRISK_GEN_ALL_CORE
For information on Fall & injury Prevention, visit [email or web address]

## 2021-08-28 NOTE — PROGRESS NOTE ADULT - PROBLEM SELECTOR PLAN 4
DVT ppx: Not indicated ,IMPROVE 0, ambulate as tolerated.   COVID negative. Received Pfizer 2nd dose on August 13th.   Dispo: Likely home in 1-2 days pending improvement in labs, symptoms. DVT ppx: Not indicated ,IMPROVE 0, ambulate as tolerated.   COVID negative. Received Pfizer 2nd dose on August 13th.   Dispo:  pt wants to go home, CESILIA resolved, rhabdo improving, will dc home , pt encouraged to drink adequate amount of  fluids   Patient hemodynamically stable for discharge home  Time spent in discharge process is 40min

## 2021-08-28 NOTE — PROGRESS NOTE ADULT - PROBLEM SELECTOR PLAN 1
CESILIA with Creatinine 3.3 on admission, unclear Baseline Cr.   Likely hemodynamically mediated in the s/o Heat exhaustion/dehydration.   Cr now improving to 2.6, continue to trend.   Continue IV fluid hydration. Monitor I/Os, currently voiding well.   Elevated Phos. Continue to monitor electrolytes (K, Phos, Mg).   Follow up repeat UA, urine electrolytes, urine toxicology.   Avoid nephrotoxic agents, renally dose medications.   Kidney U/S: unremarkable, no hydronephrosis. CESILIA with Creatinine 3.3 on admission, unclear Baseline Cr.   Likely hemodynamically mediated in the s/o Heat exhaustion/dehydration.   Cr now improved to 0.92   Continue IV fluid hydration. Monitor I/Os, currently voiding well.   Elevated Phos. Continue to monitor electrolytes (K, Phos, Mg). now hypophosphatemia:  Phosp 1.9, will supplement   Kidney U/S: unremarkable, no hydronephrosis.

## 2021-08-28 NOTE — PROGRESS NOTE ADULT - PROBLEM SELECTOR PLAN 2
In the setting of prolonged sun exposure during strenuous work.   Clinically improved, symptoms resolving.   Encourage PO intake/hydration, continue IV Fluids for now.   Trend labs, CK, lactate, Cr, electrolytes.   Counseled on adequately hydrating during exercise/strenuous work. In the setting of prolonged sun exposure during strenuous work.   Clinically improved, symptoms resolving.   Encourage PO intake/hydration, continue IV Fluids for now.   Rhabdomyolysis improving with IVF   Trend labs, CK, lactate, Cr, electrolytes.   Counseled on adequately hydrating during exercise/strenuous work.

## 2021-11-16 ENCOUNTER — EMERGENCY (EMERGENCY)
Facility: HOSPITAL | Age: 36
LOS: 1 days | Discharge: HOME | End: 2021-11-16
Admitting: EMERGENCY MEDICINE
Payer: OTHER MISCELLANEOUS

## 2021-11-16 VITALS
HEIGHT: 68 IN | DIASTOLIC BLOOD PRESSURE: 98 MMHG | TEMPERATURE: 98 F | SYSTOLIC BLOOD PRESSURE: 145 MMHG | RESPIRATION RATE: 16 BRPM | HEART RATE: 80 BPM | OXYGEN SATURATION: 100 %

## 2021-11-16 DIAGNOSIS — Z98.890 OTHER SPECIFIED POSTPROCEDURAL STATES: Chronic | ICD-10-CM

## 2021-11-16 PROCEDURE — 99283 EMERGENCY DEPT VISIT LOW MDM: CPT

## 2021-11-16 PROCEDURE — 99053 MED SERV 10PM-8AM 24 HR FAC: CPT

## 2021-11-16 NOTE — ED ADULT TRIAGE NOTE - CHIEF COMPLAINT QUOTE
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 pt is ambulatory in triage.

## 2021-11-17 PROCEDURE — 73630 X-RAY EXAM OF FOOT: CPT | Mod: 26,RT

## 2021-11-17 NOTE — ED PROVIDER NOTE - OBJECTIVE STATEMENT
35 y/o M with R toe pain after stubbing on door. pain worse when walking. Has not taken anything for pain. No other injury or complaint.

## 2021-11-17 NOTE — ED PROVIDER NOTE - PATIENT PORTAL LINK FT
You can access the FollowMyHealth Patient Portal offered by [facility] by registering at the following website: [email or web address]. By joining [employer]’s FollowMyHealth portal, you will also be able to view your health information using other applications (apps) compatible with our system.

## 2022-01-17 ENCOUNTER — EMERGENCY (EMERGENCY)
Facility: HOSPITAL | Age: 37
LOS: 1 days | Discharge: HOME | End: 2022-01-17
Attending: STUDENT IN AN ORGANIZED HEALTH CARE EDUCATION/TRAINING PROGRAM | Admitting: STUDENT IN AN ORGANIZED HEALTH CARE EDUCATION/TRAINING PROGRAM
Payer: COMMERCIAL

## 2022-01-17 VITALS
DIASTOLIC BLOOD PRESSURE: 89 MMHG | TEMPERATURE: 98 F | SYSTOLIC BLOOD PRESSURE: 140 MMHG | OXYGEN SATURATION: 100 % | HEIGHT: 68 IN | HEART RATE: 71 BPM | RESPIRATION RATE: 18 BRPM

## 2022-01-17 DIAGNOSIS — Z98.890 OTHER SPECIFIED POSTPROCEDURAL STATES: Chronic | ICD-10-CM

## 2022-01-17 PROCEDURE — 99284 EMERGENCY DEPT VISIT MOD MDM: CPT

## 2022-01-17 RX ORDER — CYCLOBENZAPRINE HYDROCHLORIDE 10 MG/1
1 TABLET, FILM COATED ORAL
Qty: 9 | Refills: 0
Start: 2022-01-17 | End: 2022-01-19

## 2022-01-17 RX ORDER — IBUPROFEN 200 MG
1 TABLET ORAL
Qty: 20 | Refills: 0
Start: 2022-01-17 | End: 2022-01-21

## 2022-01-17 RX ORDER — LIDOCAINE 4 G/100G
1 CREAM TOPICAL ONCE
Refills: 0 | Status: COMPLETED | OUTPATIENT
Start: 2022-01-17 | End: 2022-01-17

## 2022-01-17 RX ORDER — CYCLOBENZAPRINE HYDROCHLORIDE 10 MG/1
10 TABLET, FILM COATED ORAL ONCE
Refills: 0 | Status: COMPLETED | OUTPATIENT
Start: 2022-01-17 | End: 2022-01-17

## 2022-01-17 RX ORDER — KETOROLAC TROMETHAMINE 30 MG/ML
30 SYRINGE (ML) INJECTION ONCE
Refills: 0 | Status: DISCONTINUED | OUTPATIENT
Start: 2022-01-17 | End: 2022-01-17

## 2022-01-17 RX ADMIN — CYCLOBENZAPRINE HYDROCHLORIDE 10 MILLIGRAM(S): 10 TABLET, FILM COATED ORAL at 16:38

## 2022-01-17 RX ADMIN — Medication 30 MILLIGRAM(S): at 16:38

## 2022-01-17 RX ADMIN — LIDOCAINE 1 PATCH: 4 CREAM TOPICAL at 16:38

## 2022-01-17 NOTE — ED PROVIDER NOTE - CLINICAL SUMMARY MEDICAL DECISION MAKING FREE TEXT BOX
Patient is a 36 y.o male with PMHx of herniated disc who presents to ED c.o Rt side back pain s/p fall 2 days ago.     DDx- MSK strain, no midline tenderness. Plan- meds, reassess. No indication for imaging at this time.

## 2022-01-17 NOTE — ED ADULT TRIAGE NOTE - CHIEF COMPLAINT QUOTE
PT C/O right sided back pain. States was ice skating on Saturday, fell down landing on right side. Denies head trauma, LOC, AC use. PHX: HTN not on meds.

## 2022-01-17 NOTE — ED PROVIDER NOTE - NSFOLLOWUPINSTRUCTIONS_ED_ALL_ED_FT
Patient advised to follow up with PRIMARY CARE DOCTOR IN 1-2 DAYS AND ORTHOPEDIST WITHIN WEEK IF CONTINUED PATIENT  and told to return to the emergency department immediately for any new or concerning symptoms such as WORSENING PAIN, NUMBNESS OR TINGLING, WEAKNESS, CHANGES IN BOWEL OR BLADDER HABITS, CHEST PAIN, SHORTNESS OF BREATH  OR ANY OTHER COMPLAINTS. Patient agrees with plan.      Rest, stay hydrated  Avoid strenuous activity/heavy lifting   Take muscle relaxer as needed (DO NOT DRINK ALCOHOL OR DRIVE IF TAKING)   Take 600 mg every 6 hours as needed     Continue all previously prescribed medications as directed unless otherwise instructed.  Follow up with your primary care physician in 48-72 hours- bring copies of your results.  Return to the ER for worsening or persistent symptoms, and/or ANY NEW OR CONCERNING SYMPTOMS. If you have issues obtaining follow up, please call: [phone] to obtain a doctor or specialist who takes your insurance in your area.  You may call [phone] to make an appointment with the internal medicine clinic.

## 2022-01-17 NOTE — ED PROVIDER NOTE - PATIENT PORTAL LINK FT
You can access the FollowMyHealth Patient Portal offered by [facility] by registering at the following website: [email or web address]. By joining [employer]’s FollowMyHealth portal, you will also be able to view your health information using other applications (apps) compatible with our system.

## 2022-01-17 NOTE — ED PROVIDER NOTE - PROGRESS NOTE DETAILS
EMA Allen- Pt feeling better. No complaints noted. Plan for dc home on muscle relaxer and nsaid. Pt states he has orthopedist to follow up with if needed. All questions and concerns addressed. Strict return instructions given. No complaints noted.

## 2022-01-17 NOTE — ED PROVIDER NOTE - ATTENDING CONTRIBUTION TO CARE
I have personally performed a history and physical examination of the patient and discussed management with the ACP as well as the patient.  I reviewed the ACP's note and agree with the documented findings and plan of care.  I have authored and modified critical sections of the Provider Note, including but not limited to HPI, Physical Exam and MDM.    36 y.o male with PMHx of herniated disc who presents to ED c.o Rt side back pain s/p fall 2 days ago. Likely muscle spasm of lumbar given clinical exam. No evidence of kaylene tenderness or loss of function, fx less likely. Symptomatic control prn. Xrays unlikely of diagnostic yield.

## 2022-01-17 NOTE — ED PROVIDER NOTE - PHYSICAL EXAMINATION
Vital signs reviewed.   CONSTITUTIONAL: Well-appearing; well-nourished; in no apparent distress. Non-toxic appearing.   HEAD: Normocephalic, atraumatic.  EYES: PERRL, EOM intact, conjunctiva and sclera WNL.  ENT: normal nose; no rhinorrhea  NECK/LYMPH: Supple; non-tender  CARD: Normal S1, S2; no murmurs, rubs, or gallops noted.  RESP: Normal chest excursion with respiration; breath sounds clear and equal bilaterally; no wheezes, rhonchi, or rales.  ABD/GI: soft, non-distended; non-tender  EXT/MS: moves all extremities; no midline tenderness. 5/5 strength UE/LE.   SKIN: Normal for age and race; warm; dry; good turgor; no apparent lesions or exudate noted. No ecchymosis, no erythema. No rashes.   NEURO: Awake, alert, oriented x 3, no gross deficits Vital signs reviewed.   CONSTITUTIONAL: Well-appearing; well-nourished; in no apparent distress. Non-toxic appearing.   HEAD: Normocephalic, atraumatic.  EYES: PERRL, EOM intact, conjunctiva and sclera WNL.  ENT: normal nose; no rhinorrhea  NECK/LYMPH: Supple; non-tender  CARD: Normal S1, S2; no murmurs, rubs, or gallops noted.  RESP: Normal chest excursion with respiration; breath sounds clear and equal bilaterally; no wheezes, rhonchi, or rales.  ABD/GI: soft, non-distended; non-tender  EXT/MS: moves all extremities; no midline tenderness. 5/5 strength UE/LE. R lumbar paraspinal muscle spasm.  SKIN: Normal for age and race; warm; dry; good turgor; no apparent lesions or exudate noted. No ecchymosis, no erythema. No rashes.   NEURO: Awake, alert, oriented x 3, no gross deficits

## 2022-01-17 NOTE — ED PROVIDER NOTE - OBJECTIVE STATEMENT
HPI- Patient is a 36 y.o male with PMHx of herniated disc who presents to ED c.o Rt side back pain s/p fall 2 days ago. Pt states he was skating on saturday and slipped and states he fell onto Rt side. Has noted continued Rt side back pain worse with ambulating and change in position. States he feels generally sore, PT denies taking any thing for pain. Denies sob, cp, pleuritic pain, n/v/d, abd pain, dysuria, hematuria, neck pain, loc, hitting head, saddle anesthesia, changes in bowel or bladder habits.

## 2023-05-17 NOTE — ED PROVIDER NOTE - CARE PLAN
Writer called and attempted to schedule PET scan.  Central scheduling still has not received orders.  Requested for BRITTANY Andujar, to enter orders in Epic.  PA unable to directly enter orders and will request for office to re-fax PET orders.  Will follow prn/referral.    Principal Discharge DX:	Back pain   1

## 2024-01-01 NOTE — H&P ADULT - NSHPPHYSICALEXAM_GEN_ALL_CORE
Vital Signs Last 24 Hrs  T(C): 36.7 (27 Aug 2021 07:04), Max: 37.1 (27 Aug 2021 03:37)  T(F): 98.1 (27 Aug 2021 07:04), Max: 98.7 (27 Aug 2021 03:37)  HR: 81 (27 Aug 2021 07:04) (81 - 109)  BP: 149/99 (27 Aug 2021 07:04) (143/97 - 156/111)  BP(mean): --  RR: 16 (27 Aug 2021 07:04) (14 - 17)  SpO2: 99% (27 Aug 2021 07:04) (99% - 100%)  CAPILLARY BLOOD GLUCOSE    PHYSICAL EXAM:  GENERAL: NAD, well-developed  HEAD:  Atraumatic, Normocephalic  EYES: EOMI, conjunctiva and sclera clear  NECK: Supple, No JVD  CHEST/LUNG: Clear to auscultation bilaterally; No wheeze  HEART: Regular rate and rhythm; No murmurs, rubs, or gallops  ABDOMEN: Soft, Nontender, Nondistended; Bowel sounds present  EXTREMITIES:  2+ Peripheral Pulses, No clubbing, cyanosis, or edema  NEUROLOGY: non-focal  SKIN: No rashes or lesions Admission Reconciliation is Not Complete  Discharge Reconciliation is Not Complete Admission Reconciliation is Completed  Discharge Reconciliation is Completed
